# Patient Record
Sex: MALE | Race: WHITE | NOT HISPANIC OR LATINO | Employment: FULL TIME | URBAN - METROPOLITAN AREA
[De-identification: names, ages, dates, MRNs, and addresses within clinical notes are randomized per-mention and may not be internally consistent; named-entity substitution may affect disease eponyms.]

---

## 2020-09-18 ENCOUNTER — NURSE TRIAGE (OUTPATIENT)
Dept: FAMILY MEDICINE | Facility: OTHER | Age: 40
End: 2020-09-18

## 2020-09-18 NOTE — TELEPHONE ENCOUNTER
Reason for call:  Patient reporting a symptom    Symptom or request:  This is a new patient. Patients spouse Silver calling.  He does not have a primary physician anywhere and he has tested positive for covid 19 on Wed 09-16-20.  They fear he has pneumonia and is wondering what they need to do?  Go to an ER?  He is at home to speak with him.  His wife did not know where else to call since he does not have a clinic.  But she sees Caroline Urena so she is calling here to speak with any RN.      Duration (how long have symptoms been present): ongoing    Have you been treated for this before? No    Additional comments:  none    Phone Number patient can be reached at:  Home number on file [phone] (home)    Best Time:  any    Can we leave a detailed message on this number:  YES    Call taken on 9/18/2020 at 12:57 PM by Marivel Albert

## 2020-09-18 NOTE — TELEPHONE ENCOUNTER
Started last Friday. Patient was tested for covid and results were positive. Since then the patient has been experiencing a fever of 100-102, dry cough, loss of taste and smell. Denies wheezing or difficulty breathing. Fever is being treated with the alternation of Tylenol (last given at 6AM) and IBU (last given last night at 9pm-10pm). Per wife patient O2 status has been around a 94. She also mentioned that his lower lobes sound diminished. He has a dusky grey color appearance to his skin.     PLAN:   Per protocol is to be seen today. Recommended going to the ER for evaluation in case fluids needed to be administered and the patient is positive for COVID. Wife would really like to avoid going to the ER due to cost.     Huddled with provider regarding patient being seen in the clinic. Due symptoms provider agrees with plan of sending the patient to the ER. Family was informed.     Taj Duarte RN  September 18, 2020    Additional Information    Patient sounds very sick or weak to the triager    Negative: Chest pain present when not coughing    Negative: Difficulty breathing    Negative: Passed out (i.e., fainted, collapsed and was not responding)    Negative: Previous asthma attacks and this feels like asthma attack    Negative: Bluish (or gray) lips or face    Negative: Severe difficulty breathing (e.g., struggling for each breath, speaks in single words)    Negative: Rapid onset of cough and has hives    Negative: Coughing started suddenly after medicine, an allergic food or bee sting    Negative: Difficulty breathing after exposure to flames, smoke, or fumes    Negative: Sounds like a life-threatening emergency to the triager    Protocols used: COUGH-A-OH

## 2022-11-30 ENCOUNTER — APPOINTMENT (OUTPATIENT)
Dept: GENERAL RADIOLOGY | Facility: CLINIC | Age: 42
End: 2022-11-30
Attending: FAMILY MEDICINE
Payer: OTHER MISCELLANEOUS

## 2022-11-30 ENCOUNTER — HOSPITAL ENCOUNTER (EMERGENCY)
Facility: CLINIC | Age: 42
Discharge: HOME | End: 2022-11-30
Attending: FAMILY MEDICINE | Admitting: FAMILY MEDICINE
Payer: OTHER MISCELLANEOUS

## 2022-11-30 VITALS
HEART RATE: 75 BPM | SYSTOLIC BLOOD PRESSURE: 150 MMHG | OXYGEN SATURATION: 100 % | DIASTOLIC BLOOD PRESSURE: 90 MMHG | WEIGHT: 216 LBS | RESPIRATION RATE: 16 BRPM | TEMPERATURE: 97 F

## 2022-11-30 DIAGNOSIS — S22.31XA CLOSED FRACTURE OF ONE RIB OF RIGHT SIDE, INITIAL ENCOUNTER: ICD-10-CM

## 2022-11-30 PROCEDURE — 99282 EMERGENCY DEPT VISIT SF MDM: CPT | Performed by: FAMILY MEDICINE

## 2022-11-30 PROCEDURE — 72100 X-RAY EXAM L-S SPINE 2/3 VWS: CPT

## 2022-11-30 PROCEDURE — 99284 EMERGENCY DEPT VISIT MOD MDM: CPT

## 2022-11-30 PROCEDURE — 71101 X-RAY EXAM UNILAT RIBS/CHEST: CPT | Mod: RT

## 2022-11-30 ASSESSMENT — ACTIVITIES OF DAILY LIVING (ADL): ADLS_ACUITY_SCORE: 33

## 2022-11-30 NOTE — ED PROVIDER NOTES
History     Chief Complaint   Patient presents with     Fall     Back Pain     HPI  Corey Pugh is a 42 year old male who presents with low back pain after a fall 2 weeks ago.  Patient was up on the machine and he fell backwards and hit his right lower back on the machine.  He was out of town at the time.  Pain was pretty severe initially.  Patient even had pain trying to move his leg.  He does not have any leg pain any further.  He has noticed some bruising over the right side of his lower back.  Denies any trouble urinating.  It does hurt if he tries to bend or take a deep breath.    Allergies:  No Known Allergies    Problem List:    There are no problems to display for this patient.       Past Medical History:    History reviewed. No pertinent past medical history.    Past Surgical History:    History reviewed. No pertinent surgical history.    Family History:    No family history on file.    Social History:  Marital Status:   [2]  Social History     Tobacco Use     Smoking status: Never   Substance Use Topics     Alcohol use: Not Currently     Drug use: Never        Medications:    No current outpatient medications on file.        Review of Systems   All other systems reviewed and are negative.      Physical Exam   BP: (!) 150/90  Pulse: 75  Temp: 97  F (36.1  C)  Resp: 16  Weight: 98 kg (216 lb)  SpO2: 100 %      Physical Exam  Vitals and nursing note reviewed.   Constitutional:       General: He is not in acute distress.     Appearance: Normal appearance. He is not ill-appearing.   Cardiovascular:      Pulses: Normal pulses.   Pulmonary:      Effort: Pulmonary effort is normal.      Breath sounds: Normal breath sounds.   Musculoskeletal:      Lumbar back: Signs of trauma and tenderness present.        Back:    Neurological:      Mental Status: He is alert.         ED Course                 Procedures           Results for orders placed or performed during the hospital encounter of 11/30/22 (from  the past 24 hour(s))   Ribs XR, unilat 3 views + PA chest, right    Narrative    XR RIBS & CHEST RT 3VW 11/30/2022 9:16 AM     HISTORY: fall, posterior, lower rib pain      Impression    IMPRESSION: There is an acute minimally displaced right lateral 11th  rib fracture.    No evidence of pneumothorax. No acute airspace disease. Heart size is  normal.    LISA ROSARIO MD         SYSTEM ID:  [id number]   Lumbar spine XR, 2-3 views    Narrative    LUMBAR SPINE TWO TO THREE VIEWS   11/30/2022 9:17 AM     HISTORY: Fall, low back pain.    COMPARISON: None.      Impression    IMPRESSION: No fracture is identified. Mild degenerative change.    MARIO HANKINS MD         SYSTEM ID:  [id number]       Medications - No data to display     X-ray shows a fracture of the right posterior 12th rib.  This is consistent with the patient's area of pain in location and mechanism of the fall.  This has been 2 weeks out so I explained to the patient will probably be another couple weeks before things start to settle down.  Patient will continue with conservative care including Tylenol and/or ibuprofen and ice to the area.  Patient will be discharged at this time.    Assessments & Plan (with Medical Decision Making)  Right rib fracture     I have reviewed the nursing notes.    I have reviewed the findings, diagnosis, plan and need for follow up with the patient.              11/30/2022   [facility] EMERGENCY DEPT     Kings Diaz MD  11/30/22 1016

## 2022-11-30 NOTE — ED TRIAGE NOTES
Presents with right posterior back pain after a fall at work 2 days ago.     Triage Assessment     Row Name 11/30/22 0847       Triage Assessment (Adult)    Airway WDL WDL       Respiratory WDL    Respiratory WDL WDL       Skin Circulation/Temperature WDL    Skin Circulation/Temperature WDL WDL       Cardiac WDL    Cardiac WDL WDL       Peripheral/Neurovascular WDL    Peripheral Neurovascular WDL WDL       Cognitive/Neuro/Behavioral WDL    Cognitive/Neuro/Behavioral WDL WDL